# Patient Record
Sex: MALE | Employment: FULL TIME
[De-identification: names, ages, dates, MRNs, and addresses within clinical notes are randomized per-mention and may not be internally consistent; named-entity substitution may affect disease eponyms.]

---

## 2018-03-14 DIAGNOSIS — D23.9: Primary | ICD-10-CM

## 2018-05-02 DIAGNOSIS — F41.9 ANXIETY: ICD-10-CM

## 2018-05-02 RX ORDER — DULOXETIN HYDROCHLORIDE 60 MG/1
60 CAPSULE, DELAYED RELEASE ORAL DAILY
Qty: 90 CAPSULE | Refills: 1 | Status: SHIPPED | OUTPATIENT
Start: 2018-05-02 | End: 2018-08-07 | Stop reason: SDUPTHER

## 2018-07-12 DIAGNOSIS — H60.543 ECZEMA OF EXTERNAL EAR, BILATERAL: ICD-10-CM

## 2018-07-12 RX ORDER — CLOBETASOL PROPIONATE 0.5 MG/G
AEROSOL, FOAM TOPICAL
Qty: 100 G | Refills: 3 | Status: SHIPPED | OUTPATIENT
Start: 2018-07-12 | End: 2018-07-12 | Stop reason: SDUPTHER

## 2018-07-12 RX ORDER — CLOBETASOL PROPIONATE 0.5 MG/G
AEROSOL, FOAM TOPICAL
Qty: 100 G | Refills: 3 | Status: SHIPPED | OUTPATIENT
Start: 2018-07-12

## 2018-07-19 DIAGNOSIS — L70.0 ACNE VULGARIS: Primary | ICD-10-CM

## 2018-07-19 RX ORDER — CLINDAMYCIN PHOSPHATE 10 MG/G
GEL TOPICAL
Qty: 60 G | Refills: 3 | Status: SHIPPED | OUTPATIENT
Start: 2018-07-19 | End: 2018-07-26

## 2018-08-07 DIAGNOSIS — F41.9 ANXIETY: ICD-10-CM

## 2018-08-07 RX ORDER — DULOXETIN HYDROCHLORIDE 60 MG/1
60 CAPSULE, DELAYED RELEASE ORAL DAILY
Qty: 90 CAPSULE | Refills: 3 | Status: SHIPPED | OUTPATIENT
Start: 2018-08-07

## 2019-04-27 ENCOUNTER — NURSE TRIAGE (OUTPATIENT)
Dept: OTHER | Facility: CLINIC | Age: 29
End: 2019-04-27

## 2019-04-27 ENCOUNTER — HOSPITAL ENCOUNTER (EMERGENCY)
Age: 29
Discharge: HOME | End: 2019-04-28
Payer: COMMERCIAL

## 2019-04-27 DIAGNOSIS — L03.119 CELLULITIS AND ABSCESS OF LEG: Primary | ICD-10-CM

## 2019-04-27 DIAGNOSIS — L02.419 CELLULITIS AND ABSCESS OF LEG: Primary | ICD-10-CM

## 2019-04-27 PROCEDURE — 99283 EMERGENCY DEPT VISIT LOW MDM: CPT

## 2019-04-27 PROCEDURE — 10060 I&D ABSCESS SIMPLE/SINGLE: CPT

## 2019-04-27 RX ORDER — DOXYCYCLINE HYCLATE 100 MG
100 TABLET ORAL 2 TIMES DAILY
Qty: 20 TABLET | Refills: 0 | Status: SHIPPED | OUTPATIENT
Start: 2019-04-27 | End: 2019-05-07

## 2019-04-28 NOTE — ED PROVIDER NOTES
Independent    HPI: Jared Garay 33 y.o. male with a past medical history of   Past Medical History:   Diagnosis Date    ADHD (attention deficit hyperactivity disorder)     Anxiety     presents with localized pain and swelling with erythema. The patient states this began gradually. History is obtained from the patient. In nature, the pain is described as burning and aching. The patient denies any signs and symptoms of systemic illness associated with this including fever. Patient denies any other symptoms besides swelling, pain, warmth, except erythema over the localized site which is the left upper inner thigh    ROS:   Unless otherwise stated in this report or unable to obtain because of the patient's clinical or mental status as evidenced by the medical record, this patients's positive and negative responses for Review of Systems, constitutional, psych, eyes, ENT, cardiovascular, respiratory, gastrointestinal, neurological, genitourinary, musculoskeletal, integument systems and systems related to the presenting problem are either stated in the preceding or were not pertinent or were negative for the symptoms and/or complaints related to the medical problem. Nursing Notes Reviewed  There were no vitals taken for this visit. Physical Exam:  Constitutional: A&Ox3, the patient is comfortable and appears non toxic  Head: NC/AT  Eyes: No discharge from the eyes, normal sclera  ENT: oropharynx is normal, mouth is normal to inspection  Neck: supple, normal range of motion, no meningeal signs  Respiratory/Chest: The chest is non tender. Breath sounds clear to auscultation bilaterally. Not in respiratory distress, normal work of breathing  Cardiovascular: Regular rate and rhythm. No murmurs, gallops, rubs. Skin: The skin is warm and dry. The skin exam is normal except an abscess which was identified with fluctuance and minimal surrounding erythema.  The site of the location of the abscess is left upper inner thigh.  Neurologic: GCS 15    --------------------------------- RESULTS -------------------------------------------------  I have personally reviewed all laboratory and imaging results for this patient. Results are listed below. LABS:  No results found for this visit on 04/27/19. ED COURSE:       Medical Decision:  Signs and symptoms consistent with a cutaneous abscess in a immunocompetent patient with no evidence of systemic toxicity. Plan is for incision and drainage and antibiotic coverage. Incision and Drainage Procedure Note:    Indication: Abscess    Procedure: The patient was positioned appropriately and the skin over the incision site was prepped with betadine and draped in a sterile fashion. Local anesthesia was 1% lidocaine without epi 3cc was used. After appropriate analgesia was achieved, an incision was then made over the apex of the lesion with return of bloody  material. The area with then explored for loculations with hemostats and any loculations were broken up with these. The area was then irrigated. no packing was needed. The patient's tetanus status is up-to-date    The patient tolerated the procedure well    Complications: NONE    Patient nontoxic in appearance, presenting with worsening concerning left upper thigh abscess with surrounding erythema. Incision and drainage completed by this provider with bloody drainage noted, no purulent drainage noted. There is concerns for cellulitis as well. Patient educated on the importance of good follow-up care. Patient will be discharged home with doxycycline, educated on warm soaks and when to return to the emergency department with understanding. Patient nontoxic. Patient discharged home.     --------------------------------- IMPRESSION AND DISPOSITION ---------------------------------    IMPRESSION  1.  Cellulitis and abscess of leg        DISPOSITION  Disposition: Discharge to home  Patient condition is good       BANG Romero - CNP  04/28/19 1262

## 2019-04-28 NOTE — ED NOTES
Patient seen and discharged before being triaged, by nurse practioner.      Lily Hernandez RN  49/69/11 [id number]

## 2019-04-30 ENCOUNTER — CARE COORDINATION (OUTPATIENT)
Dept: CARE COORDINATION | Age: 29
End: 2019-04-30

## 2019-04-30 NOTE — CARE COORDINATION
Ambulatory Care Coordination ED Follow up Call       Reason for ED Visit:  Cellulitis and abscess of leg  Care Management Risk Score: CMRS 0  How are you feeling? :     improved  Patient Reports the following:  Patient reports he's doing good. Patient unable to continue conversation at this time. Phone call ended.

## 2020-03-16 ENCOUNTER — OFFICE VISIT (OUTPATIENT)
Dept: FAMILY MEDICINE CLINIC | Age: 30
End: 2020-03-16
Payer: COMMERCIAL

## 2020-03-16 VITALS — TEMPERATURE: 98.8 F | HEART RATE: 86 BPM | OXYGEN SATURATION: 99 %

## 2020-03-16 LAB
INFLUENZA A ANTIBODY: POSITIVE
INFLUENZA B ANTIBODY: NORMAL

## 2020-03-16 PROCEDURE — 99214 OFFICE O/P EST MOD 30 MIN: CPT | Performed by: FAMILY MEDICINE

## 2020-03-16 PROCEDURE — 87804 INFLUENZA ASSAY W/OPTIC: CPT | Performed by: FAMILY MEDICINE

## 2020-03-16 RX ORDER — OSELTAMIVIR PHOSPHATE 75 MG/1
75 CAPSULE ORAL 2 TIMES DAILY
Qty: 10 CAPSULE | Refills: 0 | Status: SHIPPED | OUTPATIENT
Start: 2020-03-16 | End: 2020-03-21

## 2020-03-16 ASSESSMENT — PATIENT HEALTH QUESTIONNAIRE - PHQ9
SUM OF ALL RESPONSES TO PHQ9 QUESTIONS 1 & 2: 0
SUM OF ALL RESPONSES TO PHQ QUESTIONS 1-9: 0
SUM OF ALL RESPONSES TO PHQ QUESTIONS 1-9: 0
1. LITTLE INTEREST OR PLEASURE IN DOING THINGS: 0
2. FEELING DOWN, DEPRESSED OR HOPELESS: 0

## 2020-03-16 NOTE — PROGRESS NOTES
changes, confusion, dizziness, headaches, memory loss, numbness/tingling, seizures or speech problems, weakness  Dermatological ROS: negative for - dry skin, mole changes, nail changes, pruritus, rash or skin lesion changes    Physical Exam  Pulse 86   Temp 98.8 °F (37.1 °C)   SpO2 99%     Wt Readings from Last 3 Encounters:   12/20/17 222 lb 6.4 oz (100.9 kg)   08/28/17 217 lb 9.6 oz (98.7 kg)   07/18/17 222 lb 12.8 oz (101.1 kg)     BP Readings from Last 3 Encounters:   12/20/17 130/80   08/28/17 138/88   07/18/17 121/75         General appearance: alert, well appearing, and in no distress, oriented to person, place, and time and normal appearing weight. HEENT:  HEAD: Atraumatic, normocephalic  EYES: PERRL  EOM intact  EARS: bilateral TM's and external ear canals normal  NOSE: nasal mucosa, septum, turbinates normal bilaterally  MOUTH: mucous membranes moist and normal tonsils  NECK: supple, full range of motion, no mass, normal lymphadenopathy, no thyromegaly    CVS exam: normal rate, regular rhythm, normal S1, S2, no murmurs, rubs, clicks or gallops. Radial pulses 2+ bilateral.  PT/DP pulse 2+ bilat. No C/C/E    Chest: clear to auscultation, no wheezes, rales or rhonchi, symmetric air entry. SKIN: no lesions, jaundice, petechiae, pallor, cyanosis, ecchymosis        Assessment/Plan  Hawk was seen today for influenza. Diagnoses and all orders for this visit:    Influenza A  -     POCT Influenza A/B- POSITIVE A  -     oseltamivir (TAMIFLU) 75 MG capsule; Take 1 capsule by mouth 2 times daily for 5 days  - Sent in ppx for pregnant wife as wel          Return if symptoms worsen or fail to improve. Bushra Torres, DO    Call or go to ED immediately if symptoms worsen or persist.    Educational materials and/or home exercises printed for patient's review and were included in patient instructions on his/her After Visit Summary and given to patient at the end of visit.        Counseled regarding above diagnosis, including possible risks and complications,  especially if left uncontrolled. Counseled regarding the possible side effects, risks, benefits and alternatives to treatment; patient and/or guardian verbalizes understanding, agrees, feels comfortable with and wishes to proceed with above treatment plan. Advised patient to call with any new medication issues, and read all Rx info from pharmacy to assure aware of all possible risks and side effects of medication before taking. Reviewed age and gender appropriate health screening exams and vaccinations. Advised patient regarding importance of keeping up with recommended health maintenance and to schedule as soon as possible if overdue, as this is important in assessing for undiagnosed pathology, especially cancer, as well as protecting against potentially harmful/life threatening disease. Patient and/or guardian verbalizes understanding and agrees with above counseling, assessment and plan. All questions answered.

## 2021-03-20 ENCOUNTER — NEW PATIENT COMPREHENSIVE (OUTPATIENT)
Dept: URBAN - METROPOLITAN AREA CLINIC 38 | Facility: CLINIC | Age: 31
End: 2021-03-20

## 2021-03-20 DIAGNOSIS — H52.13: ICD-10-CM

## 2021-03-20 PROCEDURE — 92015 DETERMINE REFRACTIVE STATE: CPT

## 2021-03-20 PROCEDURE — 92004 COMPRE OPH EXAM NEW PT 1/>: CPT

## 2021-03-20 ASSESSMENT — VISUAL ACUITY
OS_CC: 20/20
OU_SC: J1
OD_SC: J1
OD_SC: 20/400-
OU_SC: 20/400
OU_CC: 20/20
OS_SC: J1
OS_SC: 20/400-
OD_CC: 20/20

## 2021-03-20 ASSESSMENT — TONOMETRY
OD_IOP_MMHG: 15
OS_IOP_MMHG: 18

## 2021-07-23 NOTE — PATIENT DISCUSSION
Consult with [facility] for cataracts OU.  Patient interested in [address], [address] and CV OS.  Patient informed may not be candidate for advanced iol OS due to retina history.

## 2021-08-03 NOTE — PATIENT DISCUSSION
Per [facility] will offer synergy OS. Pt is aware of limited visual potential due to residual irregularity.

## 2021-09-02 NOTE — PATIENT DISCUSSION
Good postoperative appearance. Performing Laboratory: -[phone] Bill For Surgical Tray: no Lab Facility: [id number] Expected Date Of Service: 02/07/2019 Billing Type: Third-Party Bill

## 2021-09-15 NOTE — PATIENT DISCUSSION
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

## 2021-09-16 NOTE — PATIENT DISCUSSION
Cataract surgery has been performed in the first eye and activities of daily living are still impaired. The patient would like to proceed with cataract surgery in the second eye as scheduled. The patient elects Advanced Synergy OD, goal of [place].

## 2021-10-04 NOTE — PATIENT DISCUSSION
Discussed the risks/benefits of laser capsulotomy. Laser recommended. Patient elects to proceed. Understands to wait > 2 wks prior to YAG.

## 2021-10-04 NOTE — PATIENT DISCUSSION
Add Prolensa qd until otherwise directed. Also add Prednisolone Acetate qid x 1 week, and taper weekly.

## 2021-10-20 NOTE — PROCEDURE NOTE: SURGICAL
<p>Prior to commencing surgery patient identification, surgical procedure, site, and side were confirmed by Dr. Adi Rojas. Following topical proparacaine anesthesia, the patient was positioned at the YAG laser, a contact lens coupled to the cornea with methylcellulose and an axial posterior capsulotomy performed without complication using 3.2 Mj x 29. Excess methylcellulose was washed from the eye, one drop of Alphagan was instilled and the patient returned to the holding area having tolerated the procedure well and without complication. </p><p>MRN: [id number]</p><p>&nbsp;</p>

## 2021-12-16 NOTE — PROCEDURE NOTE: CLINICAL
PROCEDURE NOTE: Punctal Plugs, Nina Arnett ([id number], [id number]) OU. Diagnosis: Dry Eye Syndrome. Prior to treatment, the risks/benefits/alternatives were discussed. The patient wished to proceed with procedure. Temporary collagen plugs were inserted. Patient tolerated procedure well. There were no complications. Post procedure instructions given. David Rios

## 2022-02-10 NOTE — PROCEDURE NOTE: SURGICAL
<p>Prior to commencing surgery patient identification, surgical procedure, site, and side were confirmed by Dr. Hollis Fair. Following topical proparacaine anesthesia, the patient was positioned at the YAG laser, a contact lens coupled to the cornea with methylcellulose and an axial posterior capsulotomy performed without complication using 3.3 Mj x 28. Excess methylcellulose was washed from the eye, one drop of Alphagan was instilled and the patient returned to the holding area having tolerated the procedure well and without complication. </p><p>MRN: [id number]</p><p>&nbsp;</p>

## 2022-02-21 NOTE — PATIENT DISCUSSION
Patient approved TF at distance and near simulating [facility] goal concepicon OU.  Patient informed on Limited BCVA OS due to ERM OS. 123.8

## 2022-03-18 NOTE — PATIENT DISCUSSION
Chart to Dr. Eros Yepez for [facility]a [place].

## 2022-03-18 NOTE — PATIENT DISCUSSION
Chart to Dr. Domonique Wynn for [facility] [place].

## 2022-05-05 NOTE — PATIENT DISCUSSION
Chart to Dr. Cassi Pelaez for [facility] [place].

## 2022-08-02 NOTE — PATIENT DISCUSSION
PO with Dr. Domonique steve.  Patient instructed to give more time to heal but to see JOD to make sure healing as planned.

## 2022-08-08 NOTE — PATIENT DISCUSSION
PO with Dr. Mark steve.  Patient instructed to give more time to heal but to see JOD to make sure healing as planned.

## 2022-08-15 NOTE — PATIENT DISCUSSION
CHELA 08/15/2022: CME still present. Continue drops per TB. May need to return to TB if not resolved at next visit with [facility].

## 2022-08-15 NOTE — PATIENT DISCUSSION
08/15/2022 JOD: Reassured patient that Adie Dears can take up to 6 months for cornea to completely heal and vision settle. Discussed that vision will continue to fluctuate before it will settle. Please send back if patient is still having issues after 6 months post op.

## 2022-09-20 NOTE — PATIENT DISCUSSION
CHELA 08/15/2022: CME still present. Continue drops per TB. May need to return to TB if not resolved at next visit with [facility].

## 2022-09-20 NOTE — PATIENT DISCUSSION
09/20/2022:  Pred acetated qid for 1 week with weekly taper then discontinue.   continue Ketorolac qid os for 1 month.

## 2022-12-22 NOTE — PATIENT DISCUSSION
CHELA 08/15/2022: CME still present. Continue drops per TB. May need to return to TB if not resolved at next visit with [facility].

## 2022-12-22 NOTE — PATIENT DISCUSSION
08/15/2022 JOD: Reassured patient that Eddie Manual can take up to 6 months for cornea to completely heal and vision settle. Discussed that vision will continue to fluctuate before it will settle. Please send back if patient is still having issues after 6 months post op.

## 2022-12-22 NOTE — PROCEDURE NOTE: CLINICAL
PROCEDURE NOTE: Punctal Plugs, Dissolvable OU. Diagnosis: Dry Eye Syndrome. Anesthesia: Proparacaine 0.5%. Prior to treatment, the risks/benefits/alternatives were discussed. The patient wished to proceed with procedure. 1 drop of Proparacaine 0.5% was instilled. Puncta was dilated with punctal dilator. Dissolvable punctal plugs were inserted. Size/location of plugs inserted: Inserted oasis softplug extended duration 180 dissolvable bll 0.5 mm.  no complications. . The patient tolerated the procedure well. There were no complications. Post procedure instructions given. Jeanette Melissa

## 2022-12-22 NOTE — PATIENT DISCUSSION
Inserted oasis softplug extended duration 180 dissolvable bll 0.5 mm.  no complications.  Rx Tobramycin qid for 1 week ou.

## 2022-12-22 NOTE — PATIENT DISCUSSION
TB 08/08: Minimal CME present, Start Pred QID, Ketorolac QID, F/U [facility].

## 2023-01-18 NOTE — PATIENT DISCUSSION
Rx Lotemax SM qid for 1 week then bid for 3 weeks ou.  If patient gets relief with Lotemax then consider Doraine Shell or Cequa ou.

## 2023-01-18 NOTE — PATIENT DISCUSSION
08/15/2022 JOD: Reassured patient that Rudene Mask can take up to 6 months for cornea to completely heal and vision settle. Discussed that vision will continue to fluctuate before it will settle. Please send back if patient is still having issues after 6 months post op.

## 2023-01-18 NOTE — PATIENT DISCUSSION
Stable, Continue present management.     Cholesterol Lowering Medications          LOVASTATIN 20 MG Oral Tab Will improve w/ time, monitor.

## 2023-01-18 NOTE — PATIENT DISCUSSION
CHELA 08/15/2022: CME still present. Continue drops per TB. May need to return to TB if not resolved at next visit with [facility].

## 2023-10-23 ENCOUNTER — COMPREHENSIVE EXAM (OUTPATIENT)
Dept: URBAN - METROPOLITAN AREA CLINIC 40 | Facility: CLINIC | Age: 33
End: 2023-10-23

## 2023-10-23 DIAGNOSIS — H52.13: ICD-10-CM

## 2023-10-23 PROCEDURE — 92015 DETERMINE REFRACTIVE STATE: CPT

## 2023-10-23 PROCEDURE — 92014 COMPRE OPH EXAM EST PT 1/>: CPT

## 2023-10-23 PROCEDURE — 92310-1 LEVEL 1 CONTACT LENS MANAGEMENT

## 2023-10-23 ASSESSMENT — VISUAL ACUITY
OU_CC: 20/20
OS_CC: J1+ SLOWLY
OU_CC: J1+
OD_CC: J1+
OS_CC: 20/20-1
OD_CC: 20/20

## 2023-10-23 ASSESSMENT — TONOMETRY
OD_IOP_MMHG: 15
OS_IOP_MMHG: 16

## 2024-10-28 ENCOUNTER — COMPREHENSIVE EXAM (OUTPATIENT)
Dept: URBAN - METROPOLITAN AREA CLINIC 40 | Facility: CLINIC | Age: 34
End: 2024-10-28

## 2024-10-28 DIAGNOSIS — H52.13: ICD-10-CM

## 2024-10-28 PROCEDURE — 92310-1 LEVEL 1 SOFT LENS UPDATE

## 2024-10-28 PROCEDURE — 92015 DETERMINE REFRACTIVE STATE: CPT

## 2024-10-28 PROCEDURE — 92014 COMPRE OPH EXAM EST PT 1/>: CPT

## 2025-06-11 NOTE — PATIENT DISCUSSION
Health Maintenance       COVID-19 Vaccine (3 - 2024-25 season)  Overdue since 9/1/2024    Hepatitis A Vaccine (1 of 2 - Risk 2-dose series)  Never done           Following review of the above:  Endocrine Health maintenance topics up to date.    Note: Refer to final orders and clinician documentation.         Monitor.